# Patient Record
Sex: FEMALE | Race: OTHER | HISPANIC OR LATINO | Employment: STUDENT | URBAN - METROPOLITAN AREA
[De-identification: names, ages, dates, MRNs, and addresses within clinical notes are randomized per-mention and may not be internally consistent; named-entity substitution may affect disease eponyms.]

---
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
## 2024-02-05 ENCOUNTER — OFFICE VISIT (OUTPATIENT)
Dept: URGENT CARE | Facility: CLINIC | Age: 19
End: 2024-02-05
Payer: COMMERCIAL

## 2024-02-05 VITALS
OXYGEN SATURATION: 98 % | RESPIRATION RATE: 17 BRPM | SYSTOLIC BLOOD PRESSURE: 114 MMHG | HEART RATE: 95 BPM | DIASTOLIC BLOOD PRESSURE: 74 MMHG | BODY MASS INDEX: 21.29 KG/M2 | HEIGHT: 66 IN | TEMPERATURE: 98 F | WEIGHT: 132.5 LBS

## 2024-02-05 DIAGNOSIS — R51.9 ACUTE NONINTRACTABLE HEADACHE, UNSPECIFIED HEADACHE TYPE: Primary | ICD-10-CM

## 2024-02-05 DIAGNOSIS — Z87.42 HISTORY OF IRREGULAR MENSTRUAL CYCLES: ICD-10-CM

## 2024-02-05 LAB
B-HCG UR QL: NEGATIVE
CTP QC/QA: YES

## 2024-02-05 PROCEDURE — 96372 THER/PROPH/DIAG INJ SC/IM: CPT | Mod: S$GLB,,, | Performed by: NURSE PRACTITIONER

## 2024-02-05 PROCEDURE — 99213 OFFICE O/P EST LOW 20 MIN: CPT | Mod: 25,S$GLB,, | Performed by: NURSE PRACTITIONER

## 2024-02-05 PROCEDURE — 81025 URINE PREGNANCY TEST: CPT | Mod: S$GLB,,, | Performed by: NURSE PRACTITIONER

## 2024-02-05 RX ORDER — KETOROLAC TROMETHAMINE 30 MG/ML
30 INJECTION, SOLUTION INTRAMUSCULAR; INTRAVENOUS
Status: COMPLETED | OUTPATIENT
Start: 2024-02-05 | End: 2024-02-05

## 2024-02-05 RX ADMIN — KETOROLAC TROMETHAMINE 30 MG: 30 INJECTION, SOLUTION INTRAMUSCULAR; INTRAVENOUS at 03:02

## 2024-02-05 NOTE — LETTER
February 5, 2024      Urgent Care - [facility]  [address] [address]  Phone: [phone]  Fax: [phone]       Patient: Erma Crenshaw   YOB: 2005  Date of Visit: 02/05/2024    To Whom It May Concern:    Kishore Crenshaw  was at [facility] on 02/05/2024. The patient may return to work/school on 02/06/24 with no restrictions. If you have any questions or concerns, or if I can be of further assistance, please do not hesitate to contact me.    Sincerely,    Krystal Dick NP

## 2024-02-05 NOTE — PROGRESS NOTES
"Subjective:      Patient ID: Erma Crenshaw is a 18 y.o. female.    Vitals:  height is 5' 6" (1.676 m) and weight is 60.1 kg (132 lb 7.9 oz). Her oral temperature is 97.7 °F (36.5 °C). Her blood pressure is 114/74 and her pulse is 95. Her respiration is 17 and oxygen saturation is 98%.     Chief Complaint: Headache    Pt presents an headache that has been on going for 1 week. Pt state she has been taking ibuprofen and Excedrin extra strength, its helping temporary.    19 yo woman, reports headaches for the past week that come and go.  Reports pressure at the top of her head when she stands up.  The headaches occurs over the eye brows and the top of her head.  They are relieved by medications and sleep. She has not used any Tylenol or Advil for her headaches in the last 2 days.   She denies any fevers, chills, neck stiffness, sinus congestion or cough.  Denies any stress.  She denies any head trauma, photophobia, blurry vision, double vision, or the worst headache of her life.  No nausea or vomiting, no dizziness.  LMP 12/26/24.  Patient has periods every 2 months.  She is not using any birth control.    She has a history of migraine headaches at the age of 12.  She had migraines with an aura.  She's never had brain imaging but was treated for these headaches by her PCP.  She has not had headaches in many years.  Patient has never seen a neurologist for her headaches.      Headache   This is a new problem. The current episode started in the past 7 days. The problem occurs constantly. The problem has been unchanged. The pain is located in the Bilateral region. The quality of the pain is described as dull. The pain is at a severity of 5/10. The pain is mild. Treatments tried: ibuprofen and Excedrin. The treatment provided mild relief.       Neurological:  Positive for headaches.      Objective:     Physical Exam   Constitutional: She is oriented to person, place, and time. She appears well-developed.  Non-toxic " appearance. She does not appear ill. No distress.   HENT:   Head: Normocephalic and atraumatic.   Ears:   Right Ear: Hearing, tympanic membrane, external ear and ear canal normal.   Left Ear: Hearing, tympanic membrane, external ear and ear canal normal.   Nose: Nose normal. No mucosal edema, rhinorrhea or nasal deformity. No epistaxis. Right sinus exhibits no maxillary sinus tenderness and no frontal sinus tenderness. Left sinus exhibits no maxillary sinus tenderness and no frontal sinus tenderness.      Comments: No maxillary, frontal, temporal ttp  Mouth/Throat: Uvula is midline, oropharynx is clear and moist and mucous membranes are normal. No trismus in the jaw. Normal dentition. No uvula swelling. No posterior oropharyngeal erythema.   Eyes: Conjunctivae, EOM and lids are normal. Pupils are equal, round, and reactive to light. No scleral icterus.   Neck: Trachea normal and phonation normal. Neck supple. No neck rigidity present.   Cardiovascular: Normal rate, regular rhythm, normal heart sounds and normal pulses.   Pulmonary/Chest: Effort normal and breath sounds normal. No respiratory distress. She has no wheezes.   Abdominal: Normal appearance and bowel sounds are normal. She exhibits no distension. Soft. There is no abdominal tenderness.   Musculoskeletal: Normal range of motion.         General: No deformity. Normal range of motion.   Neurological: no focal deficit. She is alert and oriented to person, place, and time. She displays no weakness and normal reflexes. No cranial nerve deficit or sensory deficit. She exhibits normal muscle tone. Coordination and gait normal.      Comments: GCS score 15, CN 1-12 intact, A&O to self, place, time, month, year and day of the week.  EOMI intact, no nystagmus.  Motor 5/5 in UE and LE bilaterally.   Reflexes 2+ bilaterally throughout.  Sensation is intact throughout.  No pronator drift.  Normal gait.  Finger to nose testing wnl;       Skin: Skin is warm, dry, intact,  not diaphoretic and not pale.   Psychiatric: Her speech is normal and behavior is normal. Judgment and thought content normal.   Nursing note and vitals reviewed.      Assessment:     1. Acute nonintractable headache, unspecified headache type    2. History of irregular menstrual cycles      Results for orders placed or performed in visit on 02/05/24   POCT urine pregnancy   Result Value Ref Range    POC Preg Test, Ur Negative Negative     Acceptable Yes        Plan:   If patient's headache continue for 3-4 days, will refer to a PCP for brain MRI and possible referral to neurology. Hydrate as much as possible.    ER prompts given.  Strongly advised to start on birth control to regulate cycles.  Return to clinic as needed.     Acute nonintractable headache, unspecified headache type  -     POCT urine pregnancy  -     ketorolac injection 30 mg    History of irregular menstrual cycles  -     POCT urine pregnancy

## 2024-02-05 NOTE — PATIENT INSTRUCTIONS
Hydrate as much as possible.  If you continue to have the headache in 2-3 days, please return to clinic.  We will refer you to a Primary Care Provider.  You will need a brain MRI.  Go to ER for worsening symptoms such as the worst headache of your life or any neurological symptoms.

## 2024-03-13 ENCOUNTER — TELEPHONE (OUTPATIENT)
Dept: INTERNAL MEDICINE | Facility: CLINIC | Age: 19
End: 2024-03-13
Payer: COMMERCIAL

## 2024-03-13 NOTE — TELEPHONE ENCOUNTER
----- Message from Cristina Landeros sent at 3/9/2024  1:20 PM CST -----  Type:  Sooner Apoointment Request    Caller is requesting a sooner appointment.  Caller declined first available appointment listed below.  Caller will not accept being placed on the waitlist and is requesting a message be sent to doctor.  Name of Caller: Pt  When is the first available appointment?  Symptoms: Yellow fever vaccine  Would the patient rather a call back or a response via [facility]? Call  Best Call Back Number:  [phone]  Additional Information:

## 2024-03-13 NOTE — TELEPHONE ENCOUNTER
Number for [facility]:  Call Us: [phone]         Number for Travel clinic at [facility]: [phone]    Called pt to give above info    She said she was already able to resolve issue  Closing encounter

## 2025-02-05 ENCOUNTER — OFFICE VISIT (OUTPATIENT)
Dept: URGENT CARE | Facility: CLINIC | Age: 20
End: 2025-02-05
Payer: COMMERCIAL

## 2025-02-05 VITALS
HEART RATE: 109 BPM | SYSTOLIC BLOOD PRESSURE: 130 MMHG | WEIGHT: 144.63 LBS | TEMPERATURE: 99 F | DIASTOLIC BLOOD PRESSURE: 77 MMHG | BODY MASS INDEX: 23.24 KG/M2 | RESPIRATION RATE: 18 BRPM | OXYGEN SATURATION: 98 % | HEIGHT: 66 IN

## 2025-02-05 DIAGNOSIS — R09.81 NASAL CONGESTION: ICD-10-CM

## 2025-02-05 DIAGNOSIS — J06.9 VIRAL URI WITH COUGH: Primary | ICD-10-CM

## 2025-02-05 PROCEDURE — 99213 OFFICE O/P EST LOW 20 MIN: CPT | Mod: S$GLB,,, | Performed by: NURSE PRACTITIONER

## 2025-02-05 RX ORDER — BENZONATATE 200 MG/1
200 CAPSULE ORAL 3 TIMES DAILY PRN
Qty: 30 CAPSULE | Refills: 0 | Status: SHIPPED | OUTPATIENT
Start: 2025-02-05 | End: 2025-02-15

## 2025-02-05 RX ORDER — PROMETHAZINE HYDROCHLORIDE AND DEXTROMETHORPHAN HYDROBROMIDE 6.25; 15 MG/5ML; MG/5ML
5 SYRUP ORAL EVERY 6 HOURS PRN
Qty: 118 ML | Refills: 0 | Status: SHIPPED | OUTPATIENT
Start: 2025-02-05 | End: 2025-02-15

## 2025-02-05 NOTE — PROGRESS NOTES
"Subjective:      Patient ID: Erma Crenshaw is a 19 y.o. female.    Vitals:  height is 5' 6" (1.676 m) and weight is 65.6 kg (144 lb 10 oz). Her oral temperature is 99.4 °F (37.4 °C). Her blood pressure is 130/77 and her pulse is 109. Her respiration is 18 and oxygen saturation is 98%.     Chief Complaint: Cough    This is a 19 y.o. female who presents today with a chief complaint of cough, sneezing and stuffy nose that started 2 weeks ago. Pt states she has been drinking Theraflu tea.    Cough  This is a new problem. The current episode started 1 to 4 weeks ago. The problem has been gradually worsening. The problem occurs every few minutes. Associated symptoms include nasal congestion. She has tried OTC cough suppressant for the symptoms. The treatment provided no relief.       Respiratory:  Positive for cough.       Objective:     Physical Exam   Constitutional: She is oriented to person, place, and time.  Non-toxic appearance. She does not appear ill. No distress.   HENT:   Head: Normocephalic and atraumatic.   Ears:   Right Ear: Tympanic membrane normal.   Left Ear: Tympanic membrane normal.   Nose: Mucosal edema and congestion present. No rhinorrhea.   Mouth/Throat: Mucous membranes are moist. Oropharynx is clear.   Eyes: Conjunctivae are normal. Pupils are equal, round, and reactive to light. Extraocular movement intact   Cardiovascular: Normal rate, regular rhythm, normal heart sounds and normal pulses.   Pulmonary/Chest: Effort normal and breath sounds normal. No respiratory distress. She has no wheezes. She has no rhonchi. She has no rales.         Comments: Occasional moist cough noted    Abdominal: Normal appearance.   Musculoskeletal: Normal range of motion.         General: Normal range of motion.   Neurological: no focal deficit. She is alert and oriented to person, place, and time.   Skin: Skin is warm and not diaphoretic.   Psychiatric: Her behavior is normal. Mood normal.     Assessment:     1. Viral " URI with cough    2. Nasal congestion        Plan:       Viral URI with cough  -     benzonatate (TESSALON) 200 MG capsule; Take 1 capsule (200 mg total) by mouth 3 (three) times daily as needed for Cough.  Dispense: 30 capsule; Refill: 0  -     promethazine-dextromethorphan (PROMETHAZINE-DM) 6.25-15 mg/5 mL Syrp; Take 5 mLs by mouth every 6 (six) hours as needed (only when you can sleep for cough).  Dispense: 118 mL; Refill: 0    Nasal congestion

## 2025-02-05 NOTE — PATIENT INSTRUCTIONS
Drink plenty of fluids  Rest.   If you have fever you may return to work or school when you are fever free for 24 hours without using fever reducing medication.  Elevate head of bed when sleeping, use a humidifier (or a steamy shower) and use normal saline in the nasal passages to help with nasal congestion and cough.   For sore throat- avoid acidic/spicy foods   Wash hands frequently or use hand     Medications:  Fever and pain Ibuprofen (Advil or Motrin) and/or Acetaminophen (Tylenol) please read the packages for instructions  Cough and Congestion Guaifenesin (Mucinex) extended relief 1200 mg twice a day for 7 days. Flonase (Fluticasone) nasal spray. One set in the morning and one set at night.  Sore throat  Cepacol lozenges, warm salt water gargles  Runny nose/Allergy symptoms  Allegra      The cough may linger for weeks.  Cough is our bodies defense mechanism to move mucus around to prevent us from getting pneumonia.  We can't totally take the cough away.       Follow up if:  Symptoms not improved in 14 days  Fever for longer than 3 days  Cough last longer than 10 days  Increased tiredness or weakness  If you are having difficulty breathing.  (If severe call 911 or go to nearest ER)

## 2025-04-10 ENCOUNTER — OFFICE VISIT (OUTPATIENT)
Dept: URGENT CARE | Facility: CLINIC | Age: 20
End: 2025-04-10
Payer: COMMERCIAL

## 2025-04-10 VITALS
RESPIRATION RATE: 18 BRPM | TEMPERATURE: 98 F | OXYGEN SATURATION: 97 % | DIASTOLIC BLOOD PRESSURE: 76 MMHG | WEIGHT: 146.63 LBS | SYSTOLIC BLOOD PRESSURE: 121 MMHG | HEIGHT: 66 IN | HEART RATE: 87 BPM | BODY MASS INDEX: 23.57 KG/M2

## 2025-04-10 DIAGNOSIS — J06.9 VIRAL URI WITH COUGH: ICD-10-CM

## 2025-04-10 DIAGNOSIS — H10.9 CONJUNCTIVITIS OF LEFT EYE, UNSPECIFIED CONJUNCTIVITIS TYPE: Primary | ICD-10-CM

## 2025-04-10 DIAGNOSIS — J02.9 SORE THROAT: ICD-10-CM

## 2025-04-10 DIAGNOSIS — R05.9 COUGH, UNSPECIFIED TYPE: ICD-10-CM

## 2025-04-10 LAB
CTP QC/QA: YES
CTP QC/QA: YES
HETEROPH AB SER QL: NEGATIVE
SARS CORONAVIRUS 2 ANTIGEN: NEGATIVE

## 2025-04-10 RX ORDER — OFLOXACIN 3 MG/ML
1 SOLUTION/ DROPS OPHTHALMIC 4 TIMES DAILY
Qty: 5 ML | Refills: 0 | Status: SHIPPED | OUTPATIENT
Start: 2025-04-10 | End: 2025-04-15

## 2025-04-10 NOTE — PROGRESS NOTES
"Subjective:      Patient ID: Erma Crenshaw is a 19 y.o. female.    Vitals:  height is 5' 6" (1.676 m) and weight is 66.5 kg (146 lb 9.7 oz). Her oral temperature is 97.6 °F (36.4 °C). Her blood pressure is 121/76 and her pulse is 87. Her respiration is 18 and oxygen saturation is 97%.     Chief Complaint: Eye Problem    This is a 19 y.o. female who presents today with a chief complaint of left eye redness that started today, cough and sore throat that started yesterday. Pt states she has been taking prescribed cough medicine.    Eye Problem   The left eye is affected. This is a new problem. The current episode started today. The injury mechanism is unknown. The pain is at a severity of 0/10. The patient is experiencing no pain. There is No known exposure to pink eye. She Does not wear contacts. Associated symptoms include an eye discharge and eye redness. She has tried eye drops for the symptoms. The treatment provided no relief.       HENT:  Positive for sore throat.    Eyes:  Positive for eye discharge and eye redness.   Respiratory:  Positive for cough.       Objective:     Physical Exam   Constitutional: She is oriented to person, place, and time.  Non-toxic appearance. She does not appear ill. No distress.   HENT:   Head: Normocephalic and atraumatic.   Ears:   Right Ear: Tympanic membrane normal.   Left Ear: Tympanic membrane normal.   Nose: No rhinorrhea or congestion.   Mouth/Throat: Mucous membranes are moist. Posterior oropharyngeal erythema present. Tonsils are 0 on the right. Tonsils are 0 on the left. No tonsillar exudate. Oropharynx is clear.   Eyes: Pupils are equal, round, and reactive to light. Left eye exhibits discharge and exudate. Left conjunctiva is injected. Extraocular movement intact      Comments: Sclera injected, limbus not involved currently greenish drainage   Cardiovascular: Normal rate, regular rhythm, normal heart sounds and normal pulses.   Pulmonary/Chest: Effort normal and breath " sounds normal. No respiratory distress. She has no wheezes.         Comments: Occasional moist cough noted    Abdominal: Normal appearance. There is no abdominal tenderness.   Musculoskeletal:      Right lower leg: No edema.      Left lower leg: No edema.   Lymphadenopathy:     She has cervical adenopathy.        Right cervical: Superficial cervical adenopathy present.        Left cervical: Superficial cervical adenopathy present.   Neurological: no focal deficit. She is alert and oriented to person, place, and time.   Skin: Skin is not diaphoretic.   Psychiatric: Her behavior is normal. Mood normal.   Nursing note and vitals reviewed.    Results for orders placed or performed in visit on 04/10/25   SARS Coronavirus 2 Antigen, POCT Manual Read    Collection Time: 04/10/25  1:32 PM   Result Value Ref Range    SARS Coronavirus 2 Antigen Negative Negative, Presumptive Negative     Acceptable Yes    POCT Infectious mononucleosis antibody    Collection Time: 04/10/25  1:48 PM   Result Value Ref Range    Monospot Negative Negative     Acceptable Yes        Assessment:     1. Cough, unspecified type    2. Sore throat    3. Conjunctivitis of left eye, unspecified conjunctivitis type        Plan:       Cough, unspecified type  -     SARS Coronavirus 2 Antigen, POCT Manual Read  -     POCT Infectious mononucleosis antibody    Sore throat  -     POCT Infectious mononucleosis antibody    Conjunctivitis of left eye, unspecified conjunctivitis type  -     POCT Infectious mononucleosis antibody  -     ofloxacin (OCUFLOX) 0.3 % ophthalmic solution; Place 1 drop into the left eye 4 (four) times daily. for 5 days  Dispense: 5 mL; Refill: 0

## 2025-04-10 NOTE — LETTER
April 10, 2025    Erma Crenshaw  [address]  [place] [facility] [address]             Urgent Care - [facility]  Urgent Care  [address] [address]  Phone: [phone]  Fax: [phone]   April 10, 2025     Patient: Erma Crenshaw   YOB: 2005   Date of Visit: 4/10/2025       To Whom it May Concern:    Erma Crenshaw was seen in my clinic on 4/10/2025. She may return to school on 04/11/25 .    Please excuse her from any classes or work missed.    If you have any questions or concerns, please don't hesitate to call.    Sincerely,         Kenna Wren, NP

## 2025-04-10 NOTE — PATIENT INSTRUCTIONS
Drink plenty of fluids  Rest.   If you have fever you may return to work or school when you are fever free for 24 hours without using fever reducing medication.  Elevate head of bed when sleeping, use a humidifier (or a steamy shower) and use normal saline in the nasal passages to help with nasal congestion and cough.   For sore throat- avoid acidic/spicy foods   Wash hands frequently or use hand     Medications:  Fever and pain Ibuprofen (Advil or Motrin) and/or Acetaminophen (Tylenol) please read the packages for instructions  Cough and Congestion Guaifenesin (Mucinex) is an expectorant, Dextromethropan (DM) is a cough suppressant, or cough syrups of your choice.  If you were prescribed a prescription cough medication today only use as directed. Flonase (Fluticasone) nasal spray. One set in the morning and one set at night.  Sore throat  Cepacol lozenges, warm salt water gargles  Runny nose/Allergy symptoms  Allegra      The cough may linger for weeks.  Cough is our bodies defense mechanism to move mucus around to prevent us from getting pneumonia.  We can't totally take the cough away.       Follow up if:  Symptoms not improved in 14 days  Fever for longer than 3 days  Cough last longer than 10 days  Increased tiredness or weakness  If you are having difficulty breathing.  (If severe call 911 or go to nearest ER)